# Patient Record
Sex: FEMALE | Race: WHITE | NOT HISPANIC OR LATINO | Employment: STUDENT | URBAN - METROPOLITAN AREA
[De-identification: names, ages, dates, MRNs, and addresses within clinical notes are randomized per-mention and may not be internally consistent; named-entity substitution may affect disease eponyms.]

---
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
## 2025-05-07 ENCOUNTER — OFFICE VISIT (OUTPATIENT)
Dept: PEDIATRICS | Facility: CLINIC | Age: 20
End: 2025-05-07
Payer: COMMERCIAL

## 2025-05-07 VITALS — HEIGHT: 67 IN | WEIGHT: 122 LBS | BODY MASS INDEX: 19.15 KG/M2 | TEMPERATURE: 98 F

## 2025-05-07 DIAGNOSIS — S29.012A MUSCLE STRAIN OF UPPER BACK: Primary | ICD-10-CM

## 2025-05-07 DIAGNOSIS — S80.02XA CONTUSION OF LEFT KNEE, INITIAL ENCOUNTER: ICD-10-CM
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
## 2025-05-07 DIAGNOSIS — S16.1XXA STRAIN OF NECK MUSCLE, INITIAL ENCOUNTER: ICD-10-CM

## 2025-05-07 PROCEDURE — 3008F BODY MASS INDEX DOCD: CPT | Performed by: PEDIATRICS

## 2025-05-07 PROCEDURE — G2211 COMPLEX E/M VISIT ADD ON: HCPCS | Performed by: PEDIATRICS

## 2025-05-07 PROCEDURE — 99213 OFFICE O/P EST LOW 20 MIN: CPT | Performed by: PEDIATRICS

## 2025-05-07 NOTE — LETTER
May 7, 2025     Patient: Martha Spear   YOB: 2005   Date of Visit: 5/7/2025       To Whom It May Concern:    Martha Spear was seen in my clinic on 5/7/2025 at 10:40 am. Please excuse Martha for her absence from school on this day to make the appointment.    If you have any questions or concerns, please don't hesitate to call.         Sincerely,         Deandra Leo MD        CC: No Recipients
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
## 2025-05-22 ENCOUNTER — TELEPHONE (OUTPATIENT)
Dept: PEDIATRICS | Facility: CLINIC | Age: 20
End: 2025-05-22
Payer: COMMERCIAL

## 2025-05-22 DIAGNOSIS — M54.89 OTHER BACK PAIN, UNSPECIFIED CHRONICITY: Primary | ICD-10-CM

## 2025-05-22 NOTE — TELEPHONE ENCOUNTER
Please let mom know I went ahead and put a referral in for PT so she can have it -- she can certainly try some stretching at home first to see if it helps -- PT can often be helpful to facilitate this, so she is welcome to go if she would like.
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
## 2025-07-28 ENCOUNTER — TELEPHONE (OUTPATIENT)
Dept: PEDIATRICS | Facility: CLINIC | Age: 20
End: 2025-07-28
Payer: COMMERCIAL

## 2025-07-28 NOTE — TELEPHONE ENCOUNTER
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 While Martha was removing her toe nail polish, mom saw an extra layer of toe nail. As if there is a growth. Mom would like to know if Martha should come for an appt with you or should she go straight to dermatology? Please advise. Thanks       [phone]

## 2025-07-28 NOTE — TELEPHONE ENCOUNTER
I actually think that podiatry would be the place to go.  Here are a couple good ones:    Dr. Macario Flores - in our building, [address]  [phone]  [email or web address]    Dr. Christopher Burns and partners at [facility] in [facility]   [phone]  [email or web address]

## 2025-07-28 NOTE — TELEPHONE ENCOUNTER
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 Did not have this problem with her toe nails when she put on the toe nail polish. She found this issue when removing the toe nail polish.. office appt made. Told her she could make an appt any day this week but Thursday. Thanks

## 2025-09-16 ENCOUNTER — APPOINTMENT (OUTPATIENT)
Dept: PEDIATRICS | Facility: CLINIC | Age: 20
End: 2025-09-16
Payer: COMMERCIAL